# Patient Record
(demographics unavailable — no encounter records)

---

## 2025-06-18 NOTE — HISTORY OF PRESENT ILLNESS
[de-identified] : Claudia Patel is a 17-year-old female presents for evaluation of hearing loss. She states for over 4 weeks she has noticed she has asked people to repeat themselves and is not able to process every word during some conversations. She denies tinnitus or vertigo. She denies otalgia or otorrhea. She denies recurrent ear infections or fevers. She denies aural fullness. She states grandfather with hearing loss. 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 She denies ototoxic drug exposure or head trauma, She denies preceding URI.

## 2025-06-18 NOTE — PHYSICAL EXAM

## 2025-06-18 NOTE — ASSESSMENT
[FreeTextEntry1] : Claudia Patle presents for evaluation of hearing concern. Otoscopic exam is wnl. Audiogram was performed and reviewed showing type A tymps AU, hearing -8000 Hz AU. She was reassured by this.  - f/u prn